# Patient Record
Sex: FEMALE | Race: WHITE
[De-identification: names, ages, dates, MRNs, and addresses within clinical notes are randomized per-mention and may not be internally consistent; named-entity substitution may affect disease eponyms.]

---

## 2020-10-13 ENCOUNTER — HOSPITAL ENCOUNTER (EMERGENCY)
Dept: HOSPITAL 39 - ER | Age: 20
Discharge: HOME | End: 2020-10-13
Payer: SELF-PAY

## 2020-10-13 VITALS — TEMPERATURE: 97.4 F | OXYGEN SATURATION: 99 %

## 2020-10-13 VITALS — DIASTOLIC BLOOD PRESSURE: 72 MMHG | SYSTOLIC BLOOD PRESSURE: 133 MMHG

## 2020-10-13 DIAGNOSIS — R11.2: Primary | ICD-10-CM

## 2020-10-13 DIAGNOSIS — Z87.891: ICD-10-CM

## 2020-10-13 DIAGNOSIS — R53.1: ICD-10-CM

## 2020-10-13 DIAGNOSIS — R19.7: ICD-10-CM

## 2020-10-13 NOTE — ED.PDOC
History of Present Illness





- General


Chief Complaint: GI Problem


Stated Complaint: N/V/D,HA,Fatigue


Time Seen by Provider: 10/13/20 13:30


Information Source: patient


Exam Limitations: no limitations


Additional Information: 





Pt says she's had N/V/D intermittently over the past 2+ weeks. Pt says watery 

stool. Pt says yesterday she felt really weak and had trouble getting out of 

bed. Today, however, she feels a lot better, closer to baseline. She denies 

fever, abd pain, urinary symptoms, weight loss. Pt says she's had no N/V/D 

today. Pt denies sore throat, cough, SOB, CP. Pt says her periods have been 

normal. Pt declining all workup because she doesn't feel it's necessary (since 

she feels better today). She understands I cannot r/o underlying emergent issues

without some workup, but she still declines. She is willing to accept the risk.





- History of Present Illness


Worsening Factors: nothing


Associated Symptoms: diarrhea, nausea/vomiting, weakness





Review of Systems





- Review of Systems


Constitutional: States: weakness.  Denies: chills, fever


EENTM: Denies: ear pain, nose pain, nose congestion, throat pain


Respiratory: States: no symptoms reported.  Denies: cough, short of breath, 

wheezing


Cardiology: States: no symptoms reported.  Denies: chest pain, edema, 

palpitations, syncope


Gastrointestinal/Abdominal: States: diarrhea, nausea, vomiting.  Denies: 

abdominal pain, constipation


Genitourinary: States: no symptoms reported.  Denies: discharge, dysuria, 

frequency, hematuria, pain


Musculoskeletal: Denies: joint pain, muscle pain


Skin: States: no symptoms reported


Neurological: States: no symptoms reported


Endocrine: States: no symptoms reported





Past Medical History (General)





- Patient Medical History


Hx Stroke: No


Hx Congestive Heart Failure: No


Hx Diabetes: No





- Vaccination History


Hx Influenza Vaccination: Yes - 2019





- Social History


Hx Tobacco Use: Yes





- Female History


Patient is a Female of Child Bearing Age (10 -59 yrs old): Yes





Family Medical History





- Family History


  ** Mother


Family History: Unknown


Living Status: Unknown





Physical Exam





- Physical Exam


General Appearance: Alert, No apparent distress


Eyes, Ears, Nose, Throat Exam: PERRL/EOMI, pharynx normal


Neck: non-tender, full range of motion


Respiratory: chest non-tender, lungs clear, normal breath sounds, no respiratory

distress, no accessory muscle use


Cardiovascular/Chest: normal peripheral pulses, regular rate, rhythm, no edema, 

no gallop


Peripheral Pulses: No deficit


Gastrointestinal/Abdominal: normal bowel sounds, non tender, soft, no 

organomegaly


Neurologic: alert, normal mood/affect, oriented x 3


Skin Exam: normal color, warm/dry





Departure





- Departure


Clinical Impression: 


 Nausea, vomiting and diarrhea, Weakness





Time of Disposition: 13:41


Disposition: Discharge to Home or Self Care


Condition: Excellent


Departure Forms:  ED Discharge - Pt. Copy, Patient Portal Self Enrollment, Work 

Release Form


Diet: full liquid diet, bland diet


Activity: increase activity as tolerated


Prescriptions: 


Promethazine Tab [Phenergan Tablet] 25 mg PO Q6HR #20 tab


Home Medications: 


Ambulatory Orders





Promethazine Tab [Phenergan Tablet] 25 mg PO Q6HR #20 tab 10/13/20